# Patient Record
Sex: MALE | Race: WHITE | URBAN - METROPOLITAN AREA
[De-identification: names, ages, dates, MRNs, and addresses within clinical notes are randomized per-mention and may not be internally consistent; named-entity substitution may affect disease eponyms.]

---

## 2021-05-31 ENCOUNTER — EMERGENCY (EMERGENCY)
Facility: HOSPITAL | Age: 36
LOS: 1 days | Discharge: HOME | End: 2021-05-31
Attending: EMERGENCY MEDICINE
Payer: SELF-PAY

## 2021-05-31 VITALS
OXYGEN SATURATION: 98 % | SYSTOLIC BLOOD PRESSURE: 111 MMHG | RESPIRATION RATE: 18 BRPM | HEART RATE: 97 BPM | DIASTOLIC BLOOD PRESSURE: 75 MMHG | TEMPERATURE: 98 F

## 2021-05-31 PROCEDURE — 82962 GLUCOSE BLOOD TEST: CPT

## 2021-05-31 PROCEDURE — 99284 EMERGENCY DEPT VISIT MOD MDM: CPT

## 2021-05-31 PROCEDURE — 99285 EMERGENCY DEPT VISIT HI MDM: CPT

## 2021-05-31 PROCEDURE — 99053 MED SERV 10PM-8AM 24 HR FAC: CPT

## 2021-05-31 NOTE — ED PROVIDER NOTE - NSFOLLOWUPCLINICS_GEN_ALL_ED_FT
[facility]  [address] [place] [address]  Phone: [phone]  Fax: [phone]

## 2021-05-31 NOTE — ED PROVIDER NOTE - NSFOLLOWUPINSTRUCTIONS_ED_ALL_ED_FT
Do not drink alcohol in excess.  Return to ER if you have pain, fever, vomiting, feel depressed, anxious or unsafe. See contact information for detox facility. If you need any assistance for appointments please contact our Care Coordinator at [phone].

## 2021-05-31 NOTE — ED PROVIDER NOTE - PATIENT PORTAL LINK FT
You can access the FollowMyHealth Patient Portal offered by [facility] by registering at the following website: [email or web address]. By joining [email or web address]’s FollowMyHealth portal, you will also be able to view your health information using other applications (apps) compatible with our system.
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
## 2021-05-31 NOTE — ED PROVIDER NOTE - OBJECTIVE STATEMENT
On evaluation pt is alert and oriented  to self, his , current date. Pt admitted to drinking some alcohol prior to arrival. Pt has normal gait and is clinically sober. Pt adamantly requesting discharge. States has safe harbor and denies suicidal ideation. Pt noted to have are of 2cm abrasion to right forehead. States box accidently stuck him in head at work earlier this afternoon. Denied LOC.

## 2021-05-31 NOTE — ED PROVIDER NOTE - CLINICAL SUMMARY MEDICAL DECISION MAKING FREE TEXT BOX
1110p- Pt is clinical sober, walking in ED with normal gait and good hand eye coordination noted. Pt became irate when he could not locate his cellphone and accused ED staff of losing his phone. Security present to maintain safe environment. I and charge nurse repeatedly explained to pt that he did not present with cell phone. Pt walked out of ED with normal gait.  Pt is well appearing, has no new complaints and able to walk with normal gait. Pt is stable for discharge and follow up with medical doctor. Pt educated on care and need for follow up. Discussed anticipatory guidance and return precautions. Questions answered. I had a detailed discussion with the patient and/or guardian regarding the historical points, exam findings, and any diagnostic results supporting the discharge diagnosis.

## 2021-05-31 NOTE — ED ADULT NURSE REASSESSMENT NOTE - NS ED NURSE REASSESS COMMENT FT1
Patient became verbally irritated accusing staff we took his cellphone, patients belonging was just taken out from him while he is in stretcher and no cellphone during clothing change and patient was still wearing his jogging pants and doesn't want to show his pocket and doesn't want to remove his bottom and still insisting he has a phone, Dr. Kang talked to patient.  Walking straight and steady, oriented x3, refused to stay and walked out with his clothings.

## 2021-05-31 NOTE — ED PROVIDER NOTE - PHYSICAL EXAMINATION
GCS 15, no raccoon eyes, no Battles sign, no scalp step off deformities.   No cervical, thoracic or lumbosacral midline bony deformities,  +rotation and flexion-extension of neck and truncal area intact.   2cm area of abrasion to left forehead.
